# Patient Record
Sex: MALE | Race: WHITE | Employment: FULL TIME | URBAN - METROPOLITAN AREA
[De-identification: names, ages, dates, MRNs, and addresses within clinical notes are randomized per-mention and may not be internally consistent; named-entity substitution may affect disease eponyms.]

---

## 2017-06-20 PROCEDURE — 82570 ASSAY OF URINE CREATININE: CPT

## 2017-06-20 PROCEDURE — 82043 UR ALBUMIN QUANTITATIVE: CPT

## 2017-10-03 PROCEDURE — 82043 UR ALBUMIN QUANTITATIVE: CPT

## 2017-10-03 PROCEDURE — 82570 ASSAY OF URINE CREATININE: CPT

## 2017-10-03 PROCEDURE — 81001 URINALYSIS AUTO W/SCOPE: CPT

## 2017-12-04 ENCOUNTER — NURSE ONLY (OUTPATIENT)
Dept: FAMILY MEDICINE CLINIC | Facility: CLINIC | Age: 42
End: 2017-12-04

## 2017-12-04 VITALS
TEMPERATURE: 99 F | OXYGEN SATURATION: 98 % | WEIGHT: 230 LBS | DIASTOLIC BLOOD PRESSURE: 76 MMHG | HEART RATE: 120 BPM | SYSTOLIC BLOOD PRESSURE: 138 MMHG | BODY MASS INDEX: 37 KG/M2

## 2017-12-04 DIAGNOSIS — J01.00 ACUTE NON-RECURRENT MAXILLARY SINUSITIS: ICD-10-CM

## 2017-12-04 DIAGNOSIS — J20.9 ACUTE BRONCHITIS, UNSPECIFIED ORGANISM: Primary | ICD-10-CM

## 2017-12-04 DIAGNOSIS — R03.0 ELEVATED BLOOD PRESSURE READING: ICD-10-CM

## 2017-12-04 PROCEDURE — 99203 OFFICE O/P NEW LOW 30 MIN: CPT | Performed by: PHYSICIAN ASSISTANT

## 2017-12-04 RX ORDER — DOXYCYCLINE 100 MG/1
100 CAPSULE ORAL 2 TIMES DAILY
Qty: 20 CAPSULE | Refills: 0 | Status: SHIPPED | OUTPATIENT
Start: 2017-12-04 | End: 2017-12-14

## 2017-12-04 NOTE — PROGRESS NOTES
CHIEF COMPLAINT:   Patient presents with:  URI        HPI:   Yaritza Alexandre is a 38year old male who presents for cough for  3  weeks. Cough started gradually and is described as tight and deep. Patient has history of bronchitis.  This is has had similar to Insulin Syringe ([employer] INSULIN SYRINGE) 29G X 1/2\" 1 ML Does not apply Misc USE AS DIRECTED 5 TIMES DAILY FOR INSULIN INJECTIONS Disp: 500 each Rfl: 3   Multiple Vitamins-Minerals (CENTRUM) Oral Tab Take 1 Tab by mouth daily.  Disp:  Rfl:    Tadalafil (KELLENLI CARDIO: RRR without murmur  LYMPH: No cervical or supraclavicular lymphadenopathy. EXTREMITIES:  No clubbing, cyanosis, or edema.     ASSESSMENT AND PLAN:   Teresita Vaughan is a 38year old male who presents with:     ASSESSMENT:  Acute bronchitis, unspecifie Healthy Airway: Airways are normally open. Air moves in and out easily. Healthy Cilia: Tiny, hairlike cilia sweep mucus and particles up and out of the airways. Lings with Bronchitis  Bronchitis often occurs when a cold or the flu virus.  The airways be · Drink a lot of water and juice. They can soothe the throat and may help thin mucus. · Sit up or use extra pillows when in bed to help lessen coughing and congestion.   · Ask your provider about using cough medicine, pain and fever medication, or a decong The patient indicates understanding of these issues and agrees to the plan. The patient is asked to return if sx's persist or worsen. Go to ER for significant shortness of breath, persistent fever.

## 2017-12-04 NOTE — PATIENT INSTRUCTIONS
Bronchitis is generally caused by a virus that leads to a prolonged cough and in some cases wheezing and chest tightness. The cough associated with bronchitis often lasts up to three weeks.     You were given an antibiotic because of concern for possible b · Chest discomfort  · Shortness of breath  · Mild fever  A second infection, this time due to bacteria, may then occur. And, airways irritated by allergens or smoke are more likely to get infected.     Inflamed Airway: Inflammation and excess mucus narrow t Most cases of bronchitis are caused by cold or flu viruses. Antibiotics don’t treat viral illness.  Taking antibiotics when they are not needed increases your risk of getting an infection later that is antibiotic-resistant. Your provider will prescribe anti